# Patient Record
Sex: MALE | Race: WHITE
[De-identification: names, ages, dates, MRNs, and addresses within clinical notes are randomized per-mention and may not be internally consistent; named-entity substitution may affect disease eponyms.]

---

## 2020-08-25 ENCOUNTER — HOSPITAL ENCOUNTER (EMERGENCY)
Dept: HOSPITAL 26 - MED | Age: 30
Discharge: ANOTHER HOSPITAL | End: 2020-08-25
Payer: COMMERCIAL

## 2020-08-25 VITALS — DIASTOLIC BLOOD PRESSURE: 80 MMHG | SYSTOLIC BLOOD PRESSURE: 150 MMHG

## 2020-08-25 VITALS — SYSTOLIC BLOOD PRESSURE: 129 MMHG | DIASTOLIC BLOOD PRESSURE: 69 MMHG

## 2020-08-25 VITALS — HEIGHT: 73 IN | BODY MASS INDEX: 29.16 KG/M2 | WEIGHT: 220 LBS

## 2020-08-25 DIAGNOSIS — Z91.018: ICD-10-CM

## 2020-08-25 DIAGNOSIS — T18.128A: Primary | ICD-10-CM

## 2020-08-25 LAB
ANION GAP SERPL CALCULATED.3IONS-SCNC: 16.4 MMOL/L (ref 8–16)
BASOPHILS # BLD AUTO: 0.1 K/UL (ref 0–0.22)
BASOPHILS NFR BLD AUTO: 1.2 % (ref 0–2)
BUN SERPL-MCNC: 9 MG/DL (ref 7–18)
CHLORIDE SERPL-SCNC: 102 MMOL/L (ref 98–107)
CO2 SERPL-SCNC: 27.8 MMOL/L (ref 21–32)
CREAT SERPL-MCNC: 1.1 MG/DL (ref 0.6–1.3)
EOSINOPHIL # BLD AUTO: 0.6 K/UL (ref 0–0.4)
EOSINOPHIL NFR BLD AUTO: 6.5 % (ref 0–4)
ERYTHROCYTE [DISTWIDTH] IN BLOOD BY AUTOMATED COUNT: 13.2 % (ref 11.6–13.7)
GFR SERPL CREATININE-BSD FRML MDRD: 102 ML/MIN (ref 90–?)
GLUCOSE SERPL-MCNC: 100 MG/DL (ref 74–106)
HCT VFR BLD AUTO: 47.9 % (ref 36–52)
HGB BLD-MCNC: 16.6 G/DL (ref 12–18)
LYMPHOCYTES # BLD AUTO: 1.8 K/UL (ref 2–11.5)
LYMPHOCYTES NFR BLD AUTO: 19 % (ref 20.5–51.1)
MCH RBC QN AUTO: 29 PG (ref 27–31)
MCHC RBC AUTO-ENTMCNC: 35 G/DL (ref 33–37)
MCV RBC AUTO: 83 FL (ref 80–94)
MONOCYTES # BLD AUTO: 0.8 K/UL (ref 0.8–1)
MONOCYTES NFR BLD AUTO: 8.2 % (ref 1.7–9.3)
NEUTROPHILS # BLD AUTO: 6 K/UL (ref 1.8–7.7)
NEUTROPHILS NFR BLD AUTO: 65.1 % (ref 42.2–75.2)
PLATELET # BLD AUTO: 266 K/UL (ref 140–450)
POTASSIUM SERPL-SCNC: 3.2 MMOL/L (ref 3.5–5.1)
RBC # BLD AUTO: 5.76 MIL/UL (ref 4.2–6.1)
SODIUM SERPL-SCNC: 143 MMOL/L (ref 136–145)
WBC # BLD AUTO: 9.3 K/UL (ref 4.8–10.8)

## 2020-08-25 PROCEDURE — 93005 ELECTROCARDIOGRAM TRACING: CPT

## 2020-08-25 PROCEDURE — 71045 X-RAY EXAM CHEST 1 VIEW: CPT

## 2020-08-25 PROCEDURE — 36415 COLL VENOUS BLD VENIPUNCTURE: CPT

## 2020-08-25 PROCEDURE — 85025 COMPLETE CBC W/AUTO DIFF WBC: CPT

## 2020-08-25 PROCEDURE — 80048 BASIC METABOLIC PNL TOTAL CA: CPT

## 2020-08-25 PROCEDURE — 96374 THER/PROPH/DIAG INJ IV PUSH: CPT

## 2020-08-25 PROCEDURE — 99285 EMERGENCY DEPT VISIT HI MDM: CPT

## 2020-08-25 PROCEDURE — 96375 TX/PRO/DX INJ NEW DRUG ADDON: CPT

## 2020-08-25 NOTE — NUR
Patient to be transferred to [facility].  Is being transferred due 
to HIGHER LEVEL OF CARE.  Receiving facility has accepting physician and 
available space. ER physician has signed transfer form.  Patient or responsible 
party has agreed to transfer and signed form.  Patient belongings inventoried 
and will be sent with patient.  Copy of nursing notes, lab reports, EKG, 
Physicians Orders and X-rays to be sent with patient.  Report called to 
STEPHANIE MERCEDES at receiving facility.

## 2020-08-25 NOTE — NUR
PT STATES HE "ALWAYS PASSES OUT EVERYTIME HE GETS AN IV, BLOOD DRAWN, OR SEES A 
NEEDLE." PT AGREED FOR ME TO PLACE IV, PT PLACED ON CARDIAC MONTITOR/PULSE OX 
BEFORE IV PLACEMENT. COLD WASH CLOTHS PROVIDED AND PLACED ON PTS NECK/COVERING 
EYES. PT TOLERATED PRCEDURE WELL. PT REMAINED CONSCIOUS DURING AND POST IV 
PLACEMENT.

## 2020-08-25 NOTE — NUR
-------------------------------------------------------------------------------

            *** Note undone in EDM - 08/25/20 at 1848 by SKYLA ***            

-------------------------------------------------------------------------------

PT ADMITS KNOWN TO HAVE KIDNEY STONES--SEEN THIS AM SAME COMPLAINT 0F FLANK 
PAIN, RIGHT INGUINAL PAIN ---PT ADDS UNABLE TO FILL HIS RX PERCOCETS AND PAIN 
HAS GOTTEN WORSE TO A POINT HE IS VOMITING BLOOD NOW--ALL DAY TODAY AFTER DC

APPEAR JITTERY, UNABL TO STAY STILL IN [facility]

## 2020-08-25 NOTE — NUR
C/O THROAT PAIN AS IF HAS A FOOD PARTICLE LODGED IN THROAT---FULL CLEAR SPEECH, 
NO DROOLING, OR MUFFLED VOICE---SITTING UPRIGHT NO GRIMACE OR ACCESSORY MUSCLE 
USE NOTED AT THIS TIME

## 2020-08-25 NOTE — NUR
CALLED [facility] TO GIVE REPORT FOR PT. SPOKE TO ERIK WHO TOLD 
ME TO CALL BACK IN 7 MINUTES BECAUSE NURSE SUPERVISOR MAGO WAS ON ANOTHER 
LINE. 

-------------------------------------------------------------------------------

Addendum: 08/25/20 at 2142 by [email or web address]

-------------------------------------------------------------------------------

CALLED [facility] TO GIVE REPORT FOR PT. SPOKE TO ERIK WHO TOLD ME 
TO CALL BACK IN 7 MINUTES BECAUSE NURSE SUPERVISOR MAGO WAS ON ANOTHER 
LINE.

## 2020-08-25 NOTE — NUR
SPOKE TO PTS WIFE LINDA TO GIVE UPDATE ON PLAN OF CARE. WIFE MADE AWARE OF 
PLAN TO TRANSFER PT. 



[phone]

## 2020-08-25 NOTE — NUR
REPORT GIVEN TO STEPHANIE MERCEDES AT [facility]. ALL QUESTIONS ANSWERED. 
ETA OF TRANSFER GIVEN TO NURSE.